# Patient Record
Sex: MALE | Race: WHITE | Employment: UNEMPLOYED | URBAN - METROPOLITAN AREA
[De-identification: names, ages, dates, MRNs, and addresses within clinical notes are randomized per-mention and may not be internally consistent; named-entity substitution may affect disease eponyms.]

---

## 2021-05-25 ENCOUNTER — RECORDS - HEALTHEAST (OUTPATIENT)
Dept: ADMINISTRATIVE | Facility: CLINIC | Age: 21
End: 2021-05-25

## 2024-02-08 ENCOUNTER — OFFICE VISIT (OUTPATIENT)
Dept: FAMILY MEDICINE | Facility: OTHER | Age: 24
End: 2024-02-08
Payer: COMMERCIAL

## 2024-02-08 VITALS
WEIGHT: 290 LBS | TEMPERATURE: 98.3 F | OXYGEN SATURATION: 97 % | HEART RATE: 101 BPM | BODY MASS INDEX: 40.6 KG/M2 | HEIGHT: 71 IN | SYSTOLIC BLOOD PRESSURE: 163 MMHG | DIASTOLIC BLOOD PRESSURE: 96 MMHG | RESPIRATION RATE: 16 BRPM

## 2024-02-08 DIAGNOSIS — H61.21 EXCESSIVE CERUMEN IN RIGHT EAR CANAL: Primary | ICD-10-CM

## 2024-02-08 DIAGNOSIS — H66.001 NON-RECURRENT ACUTE SUPPURATIVE OTITIS MEDIA OF RIGHT EAR WITHOUT SPONTANEOUS RUPTURE OF TYMPANIC MEMBRANE: ICD-10-CM

## 2024-02-08 PROCEDURE — 99203 OFFICE O/P NEW LOW 30 MIN: CPT | Performed by: STUDENT IN AN ORGANIZED HEALTH CARE EDUCATION/TRAINING PROGRAM

## 2024-02-08 PROCEDURE — 69209 REMOVE IMPACTED EAR WAX UNI: CPT | Mod: RT | Performed by: STUDENT IN AN ORGANIZED HEALTH CARE EDUCATION/TRAINING PROGRAM

## 2024-02-08 RX ORDER — HYDROCORTISONE AND ACETIC ACID 20.75; 10.375 MG/ML; MG/ML
5 SOLUTION AURICULAR (OTIC) 3 TIMES DAILY
Qty: 10 ML | Refills: 0 | Status: SHIPPED | OUTPATIENT
Start: 2024-02-08 | End: 2024-02-15

## 2024-02-08 ASSESSMENT — PAIN SCALES - GENERAL: PAINLEVEL: NO PAIN (0)

## 2024-02-08 NOTE — NURSING NOTE
"Chief Complaint   Patient presents with    Ear Problem   Patient presents to clinic for muffled sound in his right ear. As well as as ear wax draining from it. It started about 1 month ago. He did an e-visit and was prescribed ofloxacin ear drops which didn't help at all.      Thi López on 2/8/2024 at 3:33 PM        Initial BP (!) 163/96   Pulse 101   Temp 98.3  F (36.8  C) (Tympanic)   Resp 16   Ht 1.803 m (5' 11\")   Wt 131.5 kg (290 lb)   SpO2 97%   BMI 40.45 kg/m   Estimated body mass index is 40.45 kg/m  as calculated from the following:    Height as of this encounter: 1.803 m (5' 11\").    Weight as of this encounter: 131.5 kg (290 lb).       FOOD SECURITY SCREENING QUESTIONS:    The next two questions are to help us understand your food security.  If you are feeling you need any assistance in this area, we have resources available to support you today.    Hunger Vital Signs:  Within the past 12 months we worried whether our food would run out before we got money to buy more. Never  Within the past 12 months the food we bought just didn't last and we didn't have money to get more. Never      Thi López     "

## 2024-02-08 NOTE — PATIENT INSTRUCTIONS
Ear Infection    Augmentin twice a day for one week, probiotics.    Drops up to three times a day for one week to help with canal. (Drying and steroid drops).    Follow up with PCP for persisting symptoms.    Return to rapid clinic for worsening symptoms.

## 2024-02-09 NOTE — PROGRESS NOTES
"  Assessment & Plan     (H61.21) Excessive cerumen in right ear canal  (primary encounter diagnosis)    Comment: Cerumen in the right ear canal, some of it was removed with irrigation.  There is still some that remains in the canal.    Plan: HC REMOVAL IMPACTED CERUMEN IRRIGATION/LVG         UNILAT, acetic acid-hydrocortisone (VOSOL-HC)         1-2 % otic solution          Acetic acid/hydrocortisone otic solution in the ear canal 3 times a day for a week.  Follow-up with PCP for further evaluation.  Return to rapid clinic care if symptoms worsen or change.    (H66.001) Non-recurrent acute suppurative otitis media of right ear without spontaneous rupture of tympanic membrane    Comment: Otitis media of the right ear, no evidence of rupture at this time.    Plan: amoxicillin-clavulanate (AUGMENTIN) 875-125 MG         tablet        Plan to treat with Augmentin twice a day for 7 days.  Continue over-the-counter management.  Follow-up with PCP for persisting symptoms.  Return to rapid clinic or ER for worsening or changing symptoms.  He is comfortable and agreeable with this plan for        Subjective   Bk is a 23 year old, presenting for the following health issues:  Ear Problem    HPI     Patient presents today with a one month history of right ear pain.  States he did try ofloxacin drops, this did not help.  He states pain and pressure has been getting worse.  He denies that the ears initially painful but they are annoying.  He has used used hydrogen peroxide as well as water flush.       Review of Systems  Constitutional, HEENT, cardiovascular, pulmonary, gi and gu systems are negative, except as otherwise noted.          Objective    BP (!) 163/96   Pulse 101   Temp 98.3  F (36.8  C) (Tympanic)   Resp 16   Ht 1.803 m (5' 11\")   Wt 131.5 kg (290 lb)   SpO2 97%   BMI 40.45 kg/m    Body mass index is 40.45 kg/m .    Physical Exam   GENERAL: alert and no distress  EYES: Eyes grossly normal to inspection, PERRL " and conjunctivae and sclerae normal  HENT: left ear canal and TM normal, right ear canal with cerumen around the canal, TM with erythema, bulging, suppurative fluid, nose and mouth without ulcers or lesions  NECK: no adenopathy, no asymmetry, masses, or scars  RESP: no increased work of breathing  MS: no gross musculoskeletal defects noted, no edema        Signed Electronically by: Madonna Winn PA-C

## 2024-03-04 PROBLEM — R79.89 ABNORMAL LFTS: Status: ACTIVE | Noted: 2021-04-05

## 2024-03-04 PROBLEM — I10 ESSENTIAL HYPERTENSION: Status: ACTIVE | Noted: 2021-04-05

## 2024-09-20 ENCOUNTER — OFFICE VISIT (OUTPATIENT)
Dept: URGENT CARE | Facility: URGENT CARE | Age: 24
End: 2024-09-20
Payer: COMMERCIAL

## 2024-09-20 VITALS
DIASTOLIC BLOOD PRESSURE: 89 MMHG | HEART RATE: 86 BPM | BODY MASS INDEX: 41.84 KG/M2 | SYSTOLIC BLOOD PRESSURE: 136 MMHG | WEIGHT: 300 LBS | RESPIRATION RATE: 16 BRPM | TEMPERATURE: 97.5 F | OXYGEN SATURATION: 98 %

## 2024-09-20 DIAGNOSIS — H61.21 IMPACTED CERUMEN OF RIGHT EAR: ICD-10-CM

## 2024-09-20 DIAGNOSIS — H66.91 ACUTE INFECTION OF RIGHT EAR: Primary | ICD-10-CM

## 2024-09-20 PROCEDURE — 99213 OFFICE O/P EST LOW 20 MIN: CPT | Performed by: PHYSICIAN ASSISTANT

## 2024-09-20 RX ORDER — NEOMYCIN SULFATE, POLYMYXIN B SULFATE AND HYDROCORTISONE 10; 3.5; 1 MG/ML; MG/ML; [USP'U]/ML
3 SUSPENSION/ DROPS AURICULAR (OTIC) 4 TIMES DAILY
Qty: 10 ML | Refills: 0 | Status: SHIPPED | OUTPATIENT
Start: 2024-09-20 | End: 2024-09-27

## 2024-09-20 NOTE — PROGRESS NOTES
"  Assessment & Plan     Acute infection of right ear  Will treat with Augmentin and cortisporin. Keep ear dry. Continue with over the counter analgesics as needed. Return to clinic if symptoms worsen or do not improve; otherwise follow up as needed      - amoxicillin-clavulanate (AUGMENTIN) 875-125 MG tablet; Take 1 tablet by mouth 2 times daily for 20 days.  - neomycin-polymyxin-hydrocortisone (CORTISPORIN) 3.5-17825-2 otic suspension; Place 3 drops into the right ear 4 times daily for 7 days.    Impacted cerumen of right ear    - [facility] REMOVAL IMPACTED CERUMEN IRRIGATION/LVG UNILAT          BMI  Estimated body mass index is 41.84 kg/m  as calculated from the following:    Height as of 2/8/24: 1.803 m (5' 11\").    Weight as of this encounter: 136.1 kg (300 lb).             Return in about 1 week (around 9/27/2024), or if symptoms worsen or fail to improve.            Subjective   Chief Complaint   Patient presents with    Ear Problem     Right ear pain been going on for 6 months, right side of face is starting to hurt, says ear feels swollen shut.        HPI     Ear problem     Onset of symptoms was 6 month(s) ago.  Course of illness is worsening.    Severity moderate  Current and Associated symptoms: ear pain and drainage   Treatment measures tried include tried flushing.  Predisposing factors include had ear infection earlier this year.                      Objective    /89   Pulse 86   Temp 97.5  F (36.4  C) (Tympanic)   Resp 16   Wt 136.1 kg (300 lb)   SpO2 98%   BMI 41.84 kg/m    Body mass index is 41.84 kg/m .  Physical Exam  Constitutional:       General: He is not in acute distress.  HENT:      Left Ear: Tympanic membrane and ear canal normal.      Ears:      Comments: Right canal is full of drainage. Ear lavage performed and canal cleared. Right canal has mild swelling and right TM is injected.   Neurological:      Mental Status: He is alert.                    Signed Electronically by: Deysi LIN" RICKY Thomas

## 2024-10-21 ENCOUNTER — OFFICE VISIT (OUTPATIENT)
Dept: FAMILY MEDICINE | Facility: CLINIC | Age: 24
End: 2024-10-21
Payer: COMMERCIAL

## 2024-10-21 ENCOUNTER — ANCILLARY PROCEDURE (OUTPATIENT)
Dept: GENERAL RADIOLOGY | Facility: OTHER | Age: 24
End: 2024-10-21
Payer: COMMERCIAL

## 2024-10-21 VITALS
RESPIRATION RATE: 21 BRPM | HEART RATE: 102 BPM | DIASTOLIC BLOOD PRESSURE: 86 MMHG | HEIGHT: 71 IN | BODY MASS INDEX: 40.18 KG/M2 | TEMPERATURE: 98.8 F | WEIGHT: 287 LBS | OXYGEN SATURATION: 100 % | SYSTOLIC BLOOD PRESSURE: 118 MMHG

## 2024-10-21 DIAGNOSIS — M25.571 PAIN IN JOINT, ANKLE AND FOOT, RIGHT: ICD-10-CM

## 2024-10-21 DIAGNOSIS — I10 ESSENTIAL HYPERTENSION: ICD-10-CM

## 2024-10-21 DIAGNOSIS — S99.911A ANKLE INJURY, RIGHT, INITIAL ENCOUNTER: ICD-10-CM

## 2024-10-21 DIAGNOSIS — S99.911A ANKLE INJURY, RIGHT, INITIAL ENCOUNTER: Primary | ICD-10-CM

## 2024-10-21 PROBLEM — G57.12 MERALGIA PARESTHETICA OF LEFT SIDE: Status: ACTIVE | Noted: 2021-04-10

## 2024-10-21 PROBLEM — E78.2 MIXED HYPERLIPIDEMIA: Status: ACTIVE | Noted: 2021-04-05

## 2024-10-21 PROBLEM — K76.0 HEPATIC STEATOSIS: Status: ACTIVE | Noted: 2021-04-07

## 2024-10-21 PROCEDURE — 73630 X-RAY EXAM OF FOOT: CPT | Mod: TC | Performed by: RADIOLOGY

## 2024-10-21 PROCEDURE — 99213 OFFICE O/P EST LOW 20 MIN: CPT

## 2024-10-21 PROCEDURE — 73610 X-RAY EXAM OF ANKLE: CPT | Mod: TC | Performed by: RADIOLOGY

## 2024-10-21 RX ORDER — LISINOPRIL 20 MG/1
20 TABLET ORAL DAILY
COMMUNITY

## 2024-10-21 RX ORDER — ATORVASTATIN CALCIUM 40 MG/1
40 TABLET, FILM COATED ORAL DAILY
COMMUNITY
Start: 2023-02-26

## 2024-10-21 ASSESSMENT — PAIN SCALES - GENERAL: PAINLEVEL: EXTREME PAIN (8)

## 2024-10-21 NOTE — PROGRESS NOTES
"Assessment & Plan     Ankle injury, right, initial encounter  Pain in joint, ankle and foot, right  Patient is a 24 year-old male presenting with concerns of right ankle and foot pain following fall down 3 camper stairs. Plan to obtain XR of right foot and ankle, pending formal read by radiologist. Discussed conservative management including as needed acetaminophen/ibuprofen, rest, ice, compression, and elevation. If fractured plan to place in short CAM walking boot. If no fracture present recommend use of ankle immobilizer or compression device. Follow-up if symptoms fail to improve despite above interventions. Patient understands and is agreeable to plan as discussed in clinic.  - XR Ankle Right G/E 3 Views; Future  - XR Foot Right G/E 3 Views; Future    Essential hypertension  Stable.       Rosales Bourgeois is a 24 year old, presenting for the following health issues:  Fall        10/21/2024     2:30 PM   Additional Questions   Roomed by Sally ALMAGUER   Accompanied by Wife     History of Present Illness       Reason for visit:  Hurt ankle  Symptom onset:  1-3 days ago  Symptoms include:  Hurts  Symptom intensity:  Moderate  Symptom progression:  Staying the same  Had these symptoms before:  No   He is taking medications regularly.    Presents with concerns of right ankle and foot swelling after falling down three stairs in camper yesterday. Reports fall with external rotation of right ankle. Swelling and pain following injury. States that he heard 2 popping sounds at time of injury. Pain is worse with weight bearing. Has not used ice, compression, or ibuprofen/acetaminophen.     Denies numbness, tingling, diminished sensation, or temperature discrepancy in right distal extremity.         Objective    /86   Pulse 102   Temp 98.8  F (37.1  C) (Temporal)   Resp 21   Ht 1.812 m (5' 11.34\")   Wt 130.2 kg (287 lb)   SpO2 100%   BMI 39.65 kg/m    Body mass index is 39.65 kg/m .  Physical Exam  Vitals reviewed. "   Constitutional:       General: He is not in acute distress.     Appearance: Normal appearance. He is not ill-appearing.   Cardiovascular:      Pulses:           Dorsalis pedis pulses are 2+ on the right side.        Posterior tibial pulses are 2+ on the right side.   Pulmonary:      Effort: Pulmonary effort is normal. No respiratory distress.      Breath sounds: Normal breath sounds.   Musculoskeletal:      Right ankle: Swelling (moderate swelling to lateral malleolus) present. Tenderness present over the lateral malleolus and ATF ligament. Decreased range of motion.      Right foot: Swelling (moderate swelling to lateral dorsal aspect of foot) and tenderness present.   Skin:     General: Skin is warm and dry.      Capillary Refill: Capillary refill takes less than 2 seconds.      Findings: No lesion or rash.   Neurological:      Mental Status: He is alert.      Sensory: Sensation is intact. No sensory deficit.      Motor: Motor function is intact. No weakness.   Psychiatric:         Attention and Perception: Attention and perception normal.         Mood and Affect: Mood and affect normal.        XR pending formal read by radiologist.         Signed Electronically by: KEENAN Bowman CNP

## 2024-10-21 NOTE — PATIENT INSTRUCTIONS
Please complete x-rays in [place] to determine if there is a fracture present. I will reach out once images are read by the radiologist. Please rest, ice, and elevate as much as possible to help with swelling. Please take as needed acetaminophen (Tylenol) or ibuprofen (Advil) for pain relief.     If fractured we will need to place you in a boot. If not fractured, I would recommend a compression or immobilization device for the ankle. This could be purchased at a pharmacy.

## 2024-10-22 ENCOUNTER — TELEPHONE (OUTPATIENT)
Dept: FAMILY MEDICINE | Facility: CLINIC | Age: 24
End: 2024-10-22
Payer: COMMERCIAL

## 2024-10-22 NOTE — TELEPHONE ENCOUNTER
RN called patient and relayed message below and patient  verbalized understanding and all questions answered.     Cheyenne Adams RN  [facility] - Registered Nurse  Clinic Triage [place]   October 22, 2024

## 2024-10-22 NOTE — TELEPHONE ENCOUNTER
Please call with results. Attempted at 172 on 10/21, went to voicemail.     Cherie Bourgeois,     The x-ray completed has been read by the radiologist. The x-ray is normal without findings of a fracture. You likely are dealing with a sprain/strain injury. Continue with rest, ice, elevation, and compression with an over the counter ankle compression sleeve. If symptoms continue please follow-up as further imaging or referral to orthopedics is needed.     Take care,  Kyleigh HUSSEIN, CNP